# Patient Record
Sex: FEMALE | Race: WHITE | Employment: FULL TIME | URBAN - NONMETROPOLITAN AREA
[De-identification: names, ages, dates, MRNs, and addresses within clinical notes are randomized per-mention and may not be internally consistent; named-entity substitution may affect disease eponyms.]

---

## 2017-01-27 RX ORDER — ALPRAZOLAM 0.5 MG/1
TABLET ORAL
Qty: 60 TABLET | Refills: 0 | OUTPATIENT
Start: 2017-01-27

## 2017-02-07 RX ORDER — ALPRAZOLAM 0.5 MG/1
TABLET ORAL
Qty: 60 TABLET | Refills: 1 | OUTPATIENT
Start: 2017-02-07

## 2017-03-09 ENCOUNTER — TELEPHONE (OUTPATIENT)
Dept: FAMILY MEDICINE CLINIC | Age: 57
End: 2017-03-09

## 2017-03-09 DIAGNOSIS — G47.00 INSOMNIA, UNSPECIFIED TYPE: Primary | ICD-10-CM

## 2017-03-09 RX ORDER — ALPRAZOLAM 0.5 MG/1
TABLET ORAL
Qty: 60 TABLET | Refills: 1 | OUTPATIENT
Start: 2017-03-09

## 2017-03-10 RX ORDER — ALPRAZOLAM 0.5 MG/1
TABLET ORAL
Refills: 0 | Status: CANCELLED | OUTPATIENT
Start: 2017-03-10

## 2017-03-27 ENCOUNTER — OFFICE VISIT (OUTPATIENT)
Dept: FAMILY MEDICINE CLINIC | Age: 57
End: 2017-03-27
Payer: COMMERCIAL

## 2017-03-27 VITALS
WEIGHT: 160.4 LBS | HEART RATE: 97 BPM | OXYGEN SATURATION: 99 % | BODY MASS INDEX: 25.18 KG/M2 | TEMPERATURE: 98.6 F | DIASTOLIC BLOOD PRESSURE: 90 MMHG | SYSTOLIC BLOOD PRESSURE: 130 MMHG | HEIGHT: 67 IN

## 2017-03-27 DIAGNOSIS — F51.01 PRIMARY INSOMNIA: Primary | ICD-10-CM

## 2017-03-27 DIAGNOSIS — F41.9 ANXIETY: ICD-10-CM

## 2017-03-27 PROCEDURE — 99213 OFFICE O/P EST LOW 20 MIN: CPT | Performed by: FAMILY MEDICINE

## 2017-03-27 RX ORDER — ALPRAZOLAM 0.5 MG/1
0.5 TABLET ORAL PRN
Qty: 10 TABLET | Refills: 1 | Status: SHIPPED | OUTPATIENT
Start: 2017-03-27 | End: 2017-12-27 | Stop reason: SDUPTHER

## 2017-03-27 RX ORDER — TRAZODONE HYDROCHLORIDE 50 MG/1
50 TABLET ORAL NIGHTLY
Qty: 30 TABLET | Refills: 1 | Status: SHIPPED | OUTPATIENT
Start: 2017-03-27 | End: 2017-12-27

## 2017-03-27 ASSESSMENT — ENCOUNTER SYMPTOMS
NAUSEA: 0
DIARRHEA: 0
CONSTIPATION: 0
ABDOMINAL PAIN: 0
SHORTNESS OF BREATH: 0
CHEST TIGHTNESS: 0
COUGH: 0
WHEEZING: 0

## 2017-03-27 ASSESSMENT — PATIENT HEALTH QUESTIONNAIRE - PHQ9
2. FEELING DOWN, DEPRESSED OR HOPELESS: 0
1. LITTLE INTEREST OR PLEASURE IN DOING THINGS: 0
SUM OF ALL RESPONSES TO PHQ QUESTIONS 1-9: 0
SUM OF ALL RESPONSES TO PHQ9 QUESTIONS 1 & 2: 0

## 2017-05-11 ENCOUNTER — OFFICE VISIT (OUTPATIENT)
Dept: FAMILY MEDICINE CLINIC | Age: 57
End: 2017-05-11
Payer: COMMERCIAL

## 2017-05-11 VITALS
HEART RATE: 98 BPM | OXYGEN SATURATION: 97 % | TEMPERATURE: 99.7 F | SYSTOLIC BLOOD PRESSURE: 110 MMHG | HEIGHT: 67 IN | DIASTOLIC BLOOD PRESSURE: 70 MMHG | BODY MASS INDEX: 25.21 KG/M2 | WEIGHT: 160.6 LBS

## 2017-05-11 DIAGNOSIS — F51.01 PRIMARY INSOMNIA: ICD-10-CM

## 2017-05-11 DIAGNOSIS — I47.1 SUPRAVENTRICULAR TACHYCARDIA (HCC): Primary | ICD-10-CM

## 2017-05-11 DIAGNOSIS — F41.9 ANXIETY: ICD-10-CM

## 2017-05-11 PROCEDURE — 99214 OFFICE O/P EST MOD 30 MIN: CPT | Performed by: FAMILY MEDICINE

## 2017-05-11 RX ORDER — RAMELTEON 8 MG/1
8 TABLET ORAL NIGHTLY
Qty: 30 TABLET | Refills: 3 | Status: CANCELLED | OUTPATIENT
Start: 2017-05-11

## 2017-05-11 ASSESSMENT — ENCOUNTER SYMPTOMS
COUGH: 0
ABDOMINAL PAIN: 0
NAUSEA: 0
WHEEZING: 0
CHEST TIGHTNESS: 0
CONSTIPATION: 0
SHORTNESS OF BREATH: 0
DIARRHEA: 0

## 2017-07-14 ENCOUNTER — TELEPHONE (OUTPATIENT)
Dept: FAMILY MEDICINE CLINIC | Age: 57
End: 2017-07-14

## 2017-07-17 DIAGNOSIS — Z12.31 VISIT FOR SCREENING MAMMOGRAM: ICD-10-CM

## 2017-12-06 LAB
CHOLESTEROL, TOTAL: 232 MG/DL
CHOLESTEROL/HDL RATIO: 3.1
HDLC SERPL-MCNC: 74 MG/DL (ref 35–70)
LDL CHOLESTEROL CALCULATED: 138 MG/DL (ref 0–160)
TRIGL SERPL-MCNC: 100 MG/DL
VLDLC SERPL CALC-MCNC: 20 MG/DL

## 2017-12-07 ENCOUNTER — TELEPHONE (OUTPATIENT)
Dept: FAMILY MEDICINE CLINIC | Age: 57
End: 2017-12-07

## 2017-12-07 NOTE — TELEPHONE ENCOUNTER
Please let her know that her liver and kidneys are normal. Her cholesterol is excellent. She is not anemic.

## 2017-12-27 ENCOUNTER — OFFICE VISIT (OUTPATIENT)
Dept: FAMILY MEDICINE CLINIC | Age: 57
End: 2017-12-27
Payer: COMMERCIAL

## 2017-12-27 VITALS
HEART RATE: 91 BPM | TEMPERATURE: 98.9 F | DIASTOLIC BLOOD PRESSURE: 80 MMHG | OXYGEN SATURATION: 99 % | HEIGHT: 67 IN | BODY MASS INDEX: 27.09 KG/M2 | WEIGHT: 172.6 LBS | SYSTOLIC BLOOD PRESSURE: 118 MMHG

## 2017-12-27 DIAGNOSIS — F41.9 ANXIETY: Primary | ICD-10-CM

## 2017-12-27 DIAGNOSIS — I47.1 SUPRAVENTRICULAR TACHYCARDIA (HCC): ICD-10-CM

## 2017-12-27 DIAGNOSIS — E55.9 VITAMIN D DEFICIENCY: ICD-10-CM

## 2017-12-27 PROCEDURE — 99214 OFFICE O/P EST MOD 30 MIN: CPT | Performed by: FAMILY MEDICINE

## 2017-12-27 RX ORDER — ALPRAZOLAM 0.5 MG/1
0.5 TABLET ORAL PRN
Qty: 10 TABLET | Refills: 0 | Status: SHIPPED | OUTPATIENT
Start: 2017-12-27

## 2017-12-27 ASSESSMENT — ENCOUNTER SYMPTOMS
ABDOMINAL PAIN: 0
COUGH: 0
CONSTIPATION: 0
DIARRHEA: 0
CHEST TIGHTNESS: 0
WHEEZING: 0
SHORTNESS OF BREATH: 0
BACK PAIN: 0
SINUS PRESSURE: 0

## 2017-12-27 NOTE — PROGRESS NOTES
D deficiency               Plan:       Anxiety: improving; she has not had any recent flare ups of her anxiety so I will continue to monitor. Palpitations: resolved: she is not having any symptoms right now so I will continue her off of the metoprolol. Vit D def: worsening; her vit D was low on recent labs so I recommended she take 1-2000 units a day. Return in about 1 year (around 12/27/2018) for Well woman. Orders Placed This Encounter   Medications    ALPRAZolam (XANAX) 0.5 MG tablet     Sig: Take 1 tablet by mouth as needed for Anxiety . Dispense:  10 tablet     Refill:  0       Patient given educational materials - see patient instructions. Discussed use, benefit, and side effects of prescribed medications. All patient questions answered. Pt voiced understanding. Reviewed health maintenance. Instructed to continue current medications, diet and exercise. Patient agreed with treatment plan. Follow up as directed.      Electronically signed by Roopa Salgado MD on 12/27/2017 at 11:21 AM

## 2018-02-24 ENCOUNTER — OFFICE VISIT (OUTPATIENT)
Dept: PRIMARY CARE CLINIC | Age: 58
End: 2018-02-24
Payer: COMMERCIAL

## 2018-02-24 VITALS
SYSTOLIC BLOOD PRESSURE: 138 MMHG | HEART RATE: 120 BPM | TEMPERATURE: 98.8 F | DIASTOLIC BLOOD PRESSURE: 80 MMHG | WEIGHT: 170 LBS | BODY MASS INDEX: 26.63 KG/M2 | OXYGEN SATURATION: 99 %

## 2018-02-24 DIAGNOSIS — J01.40 ACUTE NON-RECURRENT PANSINUSITIS: Primary | ICD-10-CM

## 2018-02-24 PROCEDURE — 99214 OFFICE O/P EST MOD 30 MIN: CPT | Performed by: FAMILY MEDICINE

## 2018-02-24 RX ORDER — AZITHROMYCIN 250 MG/1
TABLET, FILM COATED ORAL
Qty: 1 PACKET | Refills: 1 | Status: SHIPPED | OUTPATIENT
Start: 2018-02-24 | End: 2019-07-09

## 2018-02-24 ASSESSMENT — ENCOUNTER SYMPTOMS
RHINORRHEA: 1
SINUS PRESSURE: 1
SHORTNESS OF BREATH: 0
TROUBLE SWALLOWING: 0
NAUSEA: 0
DIARRHEA: 0
EYE DISCHARGE: 0
VOMITING: 0
EYE REDNESS: 0
WHEEZING: 0
CONSTIPATION: 0
ABDOMINAL PAIN: 0
COUGH: 1
SINUS PAIN: 1
SORE THROAT: 0

## 2018-10-19 LAB
CHOLESTEROL, TOTAL: 211 MG/DL
CHOLESTEROL/HDL RATIO: 3.1
CREATININE: 0.7 MG/DL
HDLC SERPL-MCNC: 68 MG/DL (ref 35–70)
LDL CHOLESTEROL CALCULATED: 120 MG/DL (ref 0–160)
POTASSIUM (K+): 3.9
TRIGL SERPL-MCNC: 117 MG/DL
VLDLC SERPL CALC-MCNC: 23 MG/DL

## 2018-10-23 ENCOUNTER — TELEPHONE (OUTPATIENT)
Dept: FAMILY MEDICINE CLINIC | Age: 58
End: 2018-10-23

## 2018-10-30 ENCOUNTER — TELEPHONE (OUTPATIENT)
Dept: FAMILY MEDICINE CLINIC | Age: 58
End: 2018-10-30

## 2018-10-30 NOTE — TELEPHONE ENCOUNTER
Please let her know that her blood sugar is normal. Her liver and kidneys are normal and her cholesterol is excellent. She is not anemic.  Please let her know that her thyroid is normal

## 2019-07-09 ENCOUNTER — OFFICE VISIT (OUTPATIENT)
Dept: PRIMARY CARE CLINIC | Age: 59
End: 2019-07-09
Payer: COMMERCIAL

## 2019-07-09 ENCOUNTER — HOSPITAL ENCOUNTER (EMERGENCY)
Age: 59
Discharge: HOME | End: 2019-07-09
Attending: EMERGENCY MEDICINE
Payer: COMMERCIAL

## 2019-07-09 VITALS
BODY MASS INDEX: 26.53 KG/M2 | TEMPERATURE: 98.4 F | OXYGEN SATURATION: 97 % | WEIGHT: 169 LBS | DIASTOLIC BLOOD PRESSURE: 73 MMHG | HEIGHT: 67 IN | RESPIRATION RATE: 16 BRPM | HEART RATE: 70 BPM | SYSTOLIC BLOOD PRESSURE: 125 MMHG

## 2019-07-09 VITALS
OXYGEN SATURATION: 98 % | WEIGHT: 169.6 LBS | SYSTOLIC BLOOD PRESSURE: 118 MMHG | HEIGHT: 67 IN | DIASTOLIC BLOOD PRESSURE: 80 MMHG | BODY MASS INDEX: 26.62 KG/M2 | HEART RATE: 117 BPM

## 2019-07-09 DIAGNOSIS — Z29.14 ENCOUNTER FOR PROPHYLACTIC ADMINISTRATION OF RABIES IMMUNE GLOBULIN: Primary | ICD-10-CM
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
## 2019-07-09 PROCEDURE — 90471 IMMUNIZATION ADMIN: CPT | Performed by: EMERGENCY MEDICINE

## 2019-07-09 PROCEDURE — 96372 THER/PROPH/DIAG INJ SC/IM: CPT

## 2019-07-09 PROCEDURE — 90675 RABIES VACCINE IM: CPT | Performed by: EMERGENCY MEDICINE

## 2019-07-09 PROCEDURE — 90375 RABIES IG IM/SC: CPT | Performed by: EMERGENCY MEDICINE

## 2019-07-09 PROCEDURE — 99282 EMERGENCY DEPT VISIT SF MDM: CPT

## 2019-07-09 PROCEDURE — 6360000002 HC RX W HCPCS: Performed by: EMERGENCY MEDICINE

## 2019-07-09 PROCEDURE — 99213 OFFICE O/P EST LOW 20 MIN: CPT | Performed by: FAMILY MEDICINE

## 2019-07-09 RX ADMIN — RABIES IMMUNE GLOBULIN (HUMAN) 1500 UNITS: 300 INJECTION, SOLUTION INFILTRATION; INTRAMUSCULAR at 17:27

## 2019-07-09 RX ADMIN — RABIES VACCINE 1 ML: KIT at 17:28

## 2019-07-09 ASSESSMENT — PATIENT HEALTH QUESTIONNAIRE - PHQ9
SUM OF ALL RESPONSES TO PHQ QUESTIONS 1-9: 0
1. LITTLE INTEREST OR PLEASURE IN DOING THINGS: 0
2. FEELING DOWN, DEPRESSED OR HOPELESS: 0
SUM OF ALL RESPONSES TO PHQ9 QUESTIONS 1 & 2: 0
SUM OF ALL RESPONSES TO PHQ QUESTIONS 1-9: 0

## 2019-07-09 ASSESSMENT — ENCOUNTER SYMPTOMS
WHEEZING: 0
COLOR CHANGE: 0
SHORTNESS OF BREATH: 0

## 2019-07-09 NOTE — ED PROVIDER NOTES
kg)   Height: 5' 7\" (1.702 m)     -------------------------   ,  ,  ,          Re-evaluation Notes        CRITICAL CARE:   None        CONSULTS:      PROCEDURES:  None    FINAL IMPRESSION      1. Encounter for prophylactic administration of rabies immune globulin          DISPOSITION/PLAN   DISPOSITION Decision To Discharge    Condition on Disposition    Discharged in stable condition    PATIENT REFERRED TO:  Radha Oreilly MD  [place] [address].  [phone]    Return on day 3, day 7, and day 14 for additional doses of the rabies vaccine      DISCHARGE MEDICATIONS:  New Prescriptions    No medications on file       (Please note that portions of this note were completed with a voice recognition program.  Efforts were made to edit the dictations but occasionally words are mis-transcribed.)    Alston MD, F.A.A.E.M.   Attending Emergency Physician                          Kasey Lennon MD  07/09/19 [id number]

## 2019-07-09 NOTE — PROGRESS NOTES
[facility]  [address] [facility] [address]  Dept: [phone]  Dept Fax: [phone]  Loc: [phone]    Janelle Parra is a 62 y.o. female who presents today for her medical conditions/complaints as noted below. Janelle Parra is c/o of   Chief Complaint   Patient presents with   [facility]     think she was bit by a bat on the left wrist while she was sleeping - she woke up with scratches and 2 little bite marks on her arm- has seen bats in her home before in the basements       HPI:     HPI Here today for concerns about being bit by a bat. She woke up this morning with two scratches on her arm and two small pin prick spots on her left wrist. She did not feel anything and she has no known injury. She has been feeling fine today but she worries about what she saw on google. She has had bats in her basement before so she does have a possible exposure. Past Medical History:   Diagnosis Date    Cervical dysplasia     1988 and 1990    Eczema     Generalized anxiety disorder     panic disorder    Hypercholesterolemia     with high HDL and low 5 year CV risk factor.  Insomnia     Perimenopausal     Supraventricular tachycardia (HCC)     with fevers or anxiety, xanax does help    White coat hypertension     pt checks her BP's at home and they have been good. Social History     Tobacco Use    Smoking status: Never Smoker    Smokeless tobacco: Never Used   Substance Use Topics    Alcohol use: Yes     Comment: Occasional     Current Outpatient Medications   Medication Sig Dispense Refill    ALPRAZolam (XANAX) 0.5 MG tablet Take 1 tablet by mouth as needed for Anxiety . 10 tablet 0     No current facility-administered medications for this visit. No Known Allergies    Subjective:     Review of Systems   Constitutional: Negative for activity change, appetite change, fatigue and fever.    Respiratory: Negative for shortness of breath and wheezing. Skin: Positive for wound. Negative for color change and rash. Neurological: Negative for dizziness, weakness and numbness. Objective:      Physical Exam   Constitutional: She appears well-developed and well-nourished. No distress. Musculoskeletal:        Arms:  Psychiatric: Her mood appears anxious. Nursing note and vitals reviewed. /80 (Site: Left Upper Arm, Position: Sitting, Cuff Size: Medium Adult)   Pulse 117   Ht 5' 7\" (1.702 m)   Wt 169 lb 9.6 oz (76.9 kg)   LMP 08/26/2011   SpO2 98%   BMI 26.56 kg/m²     Assessment:       Diagnosis Orders   1. Bat bite wound               Plan:        Possible bat bite: new; I cannot conclude that she was not bit by a bat so I sent her to the ER for evaluation and possible post exposure prophylaxis. Return if symptoms worsen or fail to improve. Patientgiven educational materials - see patient instructions. Discussed use, benefit,and side effects of prescribed medications. All patient questions answered. Ptvoiced understanding. Reviewed health maintenance. Instructed to continue currentmedications, diet and exercise. Patient agreed with treatment plan. Follow up asdirected.      Electronically signed by Shaina Blum MD on 7/9/2019 at 4:10 PM

## 2019-07-12 ENCOUNTER — HOSPITAL ENCOUNTER (OUTPATIENT)
Dept: INFUSION THERAPY | Age: 59
Setting detail: INFUSION SERIES
Discharge: HOME | End: 2019-07-12
Payer: COMMERCIAL

## 2019-07-12 PROCEDURE — 90675 RABIES VACCINE IM: CPT

## 2019-07-12 PROCEDURE — 6360000002 HC RX W HCPCS

## 2019-07-12 PROCEDURE — 90471 IMMUNIZATION ADMIN: CPT

## 2019-07-12 RX ADMIN — RABIES VACCINE 1 ML: KIT at 10:17

## 2019-07-16 ENCOUNTER — HOSPITAL ENCOUNTER (OUTPATIENT)
Dept: INFUSION THERAPY | Age: 59
Setting detail: INFUSION SERIES
Discharge: HOME | End: 2019-07-16
Payer: COMMERCIAL

## 2019-07-16 VITALS
OXYGEN SATURATION: 98 % | SYSTOLIC BLOOD PRESSURE: 145 MMHG | RESPIRATION RATE: 16 BRPM | HEART RATE: 90 BPM | DIASTOLIC BLOOD PRESSURE: 82 MMHG | TEMPERATURE: 98.7 F

## 2019-07-16 PROCEDURE — 90471 IMMUNIZATION ADMIN: CPT | Performed by: EMERGENCY MEDICINE

## 2019-07-16 PROCEDURE — 90675 RABIES VACCINE IM: CPT | Performed by: EMERGENCY MEDICINE

## 2019-07-16 PROCEDURE — 6360000002 HC RX W HCPCS: Performed by: EMERGENCY MEDICINE

## 2019-07-16 RX ADMIN — RABIES VACCINE 1 ML: KIT at 09:37

## 2019-07-23 ENCOUNTER — HOSPITAL ENCOUNTER (OUTPATIENT)
Dept: INFUSION THERAPY | Age: 59
Setting detail: INFUSION SERIES
Discharge: HOME | End: 2019-07-23
Payer: COMMERCIAL

## 2019-07-23 VITALS
OXYGEN SATURATION: 98 % | TEMPERATURE: 99.3 F | DIASTOLIC BLOOD PRESSURE: 79 MMHG | HEART RATE: 95 BPM | RESPIRATION RATE: 12 BRPM | SYSTOLIC BLOOD PRESSURE: 131 MMHG

## 2019-07-23 PROCEDURE — 90675 RABIES VACCINE IM: CPT | Performed by: EMERGENCY MEDICINE

## 2019-07-23 PROCEDURE — 90471 IMMUNIZATION ADMIN: CPT | Performed by: EMERGENCY MEDICINE

## 2019-07-23 PROCEDURE — 6360000002 HC RX W HCPCS: Performed by: EMERGENCY MEDICINE

## 2019-07-23 RX ADMIN — RABIES VACCINE 1 ML: KIT at 13:35

## 2019-08-14 ENCOUNTER — HOSPITAL ENCOUNTER (OUTPATIENT)
Age: 59
Setting detail: SPECIMEN
Discharge: HOME | End: 2019-08-14
Payer: COMMERCIAL

## 2019-08-14 ENCOUNTER — OFFICE VISIT (OUTPATIENT)
Dept: FAMILY MEDICINE CLINIC | Age: 59
End: 2019-08-14
Payer: COMMERCIAL

## 2019-08-14 VITALS
HEART RATE: 105 BPM | OXYGEN SATURATION: 99 % | SYSTOLIC BLOOD PRESSURE: 110 MMHG | WEIGHT: 171.6 LBS | BODY MASS INDEX: 26.88 KG/M2 | DIASTOLIC BLOOD PRESSURE: 70 MMHG

## 2019-08-14 DIAGNOSIS — F41.1 GAD (GENERALIZED ANXIETY DISORDER): ICD-10-CM

## 2019-08-14 DIAGNOSIS — Z12.4 CERVICAL CANCER SCREENING: ICD-10-CM

## 2019-08-14 DIAGNOSIS — Z01.419 WELL WOMAN EXAM WITH ROUTINE GYNECOLOGICAL EXAM: Primary | ICD-10-CM

## 2019-08-14 PROCEDURE — 87624 HPV HI-RISK TYP POOLED RSLT: CPT

## 2019-08-14 PROCEDURE — 99214 OFFICE O/P EST MOD 30 MIN: CPT | Performed by: FAMILY MEDICINE

## 2019-08-14 PROCEDURE — G0145 SCR C/V CYTO,THINLAYER,RESCR: HCPCS

## 2019-08-14 PROCEDURE — 99396 PREV VISIT EST AGE 40-64: CPT | Performed by: FAMILY MEDICINE

## 2019-08-14 RX ORDER — ESCITALOPRAM OXALATE 10 MG/1
10 TABLET ORAL DAILY
Qty: 30 TABLET | Refills: 1 | Status: SHIPPED | OUTPATIENT
Start: 2019-08-14

## 2019-08-14 RX ORDER — BUSPIRONE HYDROCHLORIDE 10 MG/1
10 TABLET ORAL 3 TIMES DAILY PRN
Qty: 90 TABLET | Refills: 1 | Status: SHIPPED | OUTPATIENT
Start: 2019-08-14 | End: 2019-09-13

## 2019-08-14 ASSESSMENT — ENCOUNTER SYMPTOMS
ABDOMINAL PAIN: 0
SHORTNESS OF BREATH: 0
BACK PAIN: 0
WHEEZING: 0
CONSTIPATION: 0
COUGH: 0
CHEST TIGHTNESS: 0
DIARRHEA: 0

## 2019-08-14 NOTE — PROGRESS NOTES
[facility]  [address]  [place] [address]  Dept: [phone]  Dept Fax: [phone]  Loc: [phone]    Gena Larry is a 62 y.o. female who presents today for her medical conditions/complaints as noted below. Gena Larry is c/o of   Chief Complaint   Patient presents with    Gynecologic Exam    Anxiety     feels like she has been a nervous wreck - with everything that is going on    Other     left side under collar bone- swollen gland- appeared after 3rd rabies shot       HPI:     HPI Here today for a well visit and she is feeling more anxious. Anxiety: worsening; she is really stressed out. After her recent possible bat bite she has been really overwhelmed. Her oldest son went through a bad divorce this year and has really taken a toll on her. She also also has a swollen lymph node that is really worrying her. It started right after her rabies vaccine. Today is the first day of school for her too. Her  had a hard attack several years ago so she worried about him all of the time. She would rather not be on medication if she can avoid it. Diet: she is trying to eat a healthy low carb and low fat diet. Exercise: she has been riding bikes 4x a week. She is walking and will do a couple of miles a day. She is not having periods. She is not having any pain with sex. No vaginal discharge. Past Medical History:   Diagnosis Date    Cervical dysplasia     1988 and 1990    Eczema     Generalized anxiety disorder     panic disorder    Hypercholesterolemia     with high HDL and low 5 year CV risk factor.  Insomnia     Perimenopausal     Supraventricular tachycardia (HCC)     with fevers or anxiety, xanax does help    White coat hypertension     pt checks her BP's at home and they have been good.           Social History     Tobacco Use    Smoking status: Never Smoker    Smokeless tobacco: Never Used   Substance Use Topics    She has no rales. Abdominal: Soft. Bowel sounds are normal. She exhibits no distension. There is no tenderness. There is no guarding. Genitourinary: Vagina normal and uterus normal. There is no rash, tenderness, lesion or injury on the right labia. There is no rash, tenderness, lesion or injury on the left labia. Cervix exhibits no motion tenderness, no discharge and no friability. Right adnexum displays no mass, no tenderness and no fullness. Left adnexum displays no mass, no tenderness and no fullness. Genitourinary Comments: Significant bladder prolapse   Musculoskeletal: Normal range of motion. She exhibits no edema. Lymphadenopathy:     She has no cervical adenopathy. Neurological: She is alert and oriented to person, place, and time. Skin: Skin is warm and dry. No rash noted. Psychiatric: She has a normal mood and affect. Her behavior is normal. Judgment normal.   Nursing note and vitals reviewed. /70 (Site: Left Upper Arm, Position: Sitting, Cuff Size: Medium Adult)   Pulse 105   Wt 171 lb 9.6 oz (77.8 kg)   LMP 08/26/2011   SpO2 99%   BMI 26.88 kg/m²     Assessment:       Diagnosis Orders   1. Well woman exam with routine gynecological exam     2. Cervical cancer screening  PAP SMEAR   3. LUDMILA (generalized anxiety disorder)               Plan:        Well woman: she is doing very well overall. She is due for her pap today so that was done. She recently had a normal mammogram. I encouraged her to stay active and to try to eat healthy. She was reassured that she does not have any enlarged lymph nodes. Anxiety: worsening; she is really struggling so Katherine Negro was started on lexapro. I explained that the main side effect is GI distress. I will start with a low dose and titrate up as needed. she was instructed that it can take 4-6weeks before the medication is working to its full potential. I will see how she is doing in a month and increase the dose as needed.  I also explained that

## 2019-08-16 LAB
HPV SAMPLE: NORMAL
HPV, GENOTYPE 16: NOT DETECTED
HPV, GENOTYPE 18: NOT DETECTED
HPV, HIGH RISK OTHER: NOT DETECTED
HPV, INTERPRETATION: NORMAL
SPECIMEN DESCRIPTION: NORMAL

## 2019-08-19 LAB — CYTOLOGY REPORT: NORMAL

## 2019-11-14 ENCOUNTER — TELEPHONE (OUTPATIENT)
Dept: FAMILY MEDICINE CLINIC | Age: 59
End: 2019-11-14

## 2021-07-15 ENCOUNTER — TELEPHONE (OUTPATIENT)
Dept: FAMILY MEDICINE CLINIC | Age: 61
End: 2021-07-15

## 2021-07-15 NOTE — TELEPHONE ENCOUNTER
As far as I know she would not need a booster.  She would probably need the series again if she had another possible bite though and those are only done in the ER

## 2021-07-15 NOTE — TELEPHONE ENCOUNTER
2 years ago pt got rabies vaccine due to a suspicous bite and wanted to know if she needed a booster shot and where to go to get one she, she did not want to go to the ER again for one and tried the health department but they did not do those either

## 2021-07-16 ENCOUNTER — HOSPITAL ENCOUNTER (EMERGENCY)
Age: 61
Discharge: HOME | End: 2021-07-16
Attending: EMERGENCY MEDICINE
Payer: COMMERCIAL

## 2021-07-16 VITALS
HEART RATE: 102 BPM | WEIGHT: 170 LBS | OXYGEN SATURATION: 98 % | HEIGHT: 67 IN | BODY MASS INDEX: 26.68 KG/M2 | DIASTOLIC BLOOD PRESSURE: 92 MMHG | TEMPERATURE: 98.6 F | RESPIRATION RATE: 14 BRPM | SYSTOLIC BLOOD PRESSURE: 148 MMHG

## 2021-07-16 DIAGNOSIS — Z00.00 NORMAL EXAM: Primary | ICD-10-CM

## 2021-07-16 PROCEDURE — 99285 EMERGENCY DEPT VISIT HI MDM: CPT

## 2021-07-16 NOTE — ED PROVIDER NOTES
[facility] ED  [address]  [place] [address]  Phone: [phone]        [facility] [place] ED  EMERGENCY DEPARTMENT ENCOUNTER      Pt Name: Carmita Flynn  MRN: [id number]  Raf[place]prate[place] 1960  Date of evaluation: 7/16/2021  Provider: Oniel Robbins DO    CHIEF COMPLAINT       Chief Complaint   Patient presents with    Other     possible bat exposure, not bite          HISTORY OF PRESENT ILLNESS   (Location/Symptom, Timing/Onset,Context/Setting, Quality, Duration, Modifying Factors, Severity)  Note limiting factors. Carmita Flynn is a 61 y.o. female who presents to the emergency department Requesting that we check her scalp for a bite wound. Patient states that she was outside 2 days ago when something flew in her hair. She does not know what it was. Does not think it landed on her or bit her, however, it flew in her hair. Patient states 2 years ago she had an exposure to a bat and she did end up receiving a full postexposure rabies prophylaxis course. Patient currently denying any symptoms but has concerned because of possible exposure. Would like us to check her for bite wound    Nursing Notes were reviewed. REVIEW OF SYSTEMS    (2-9systems for level 4, 10 or more for level 5)     Review of Systems   Constitutional: Negative for fever. Skin: Negative for rash and wound. Except asnoted above the remainder of the review of systems was reviewed and negative. PAST MEDICAL HISTORY     Past Medical History:   Diagnosis Date    Cervical dysplasia     1988 and 1990    Eczema     Generalized anxiety disorder     panic disorder    Hypercholesterolemia     with high HDL and low 5 year CV risk factor.  Insomnia     Perimenopausal     Supraventricular tachycardia (HCC)     with fevers or anxiety, xanax does help    White coat hypertension     pt checks her BP's at home and they have been good.          SURGICAL HISTORY       Past Surgical History: Procedure Laterality Date    COLONOSCOPY  2011    x3 due to mild cercial atypia, removal of small hyperplastic polyp, repeat in 10 years    COLPOSCOPY      had several 88 and 90'         CURRENT MEDICATIONS     Previous Medications    ALPRAZOLAM (XANAX) 0.5 MG TABLET    Take 1 tablet by mouth as needed for Anxiety . ESCITALOPRAM (LEXAPRO) 10 MG TABLET    Take 1 tablet by mouth daily       ALLERGIES     Patient has no known allergies. FAMILY HISTORY       Family History   Problem Relation Age of Onset    Stroke Mother         transient ischemic attack    Other Father         macular degeneration    High Cholesterol Father     Thyroid Disease Father           SOCIAL HISTORY       Social History     Socioeconomic History    Marital status:      Spouse name: None    Number of children: None    Years of education: None    Highest education level: None   Occupational History    None   Tobacco Use    Smoking status: Never Smoker    Smokeless tobacco: Never Used   Substance and Sexual Activity    Alcohol use: Yes     Comment: Occasional    Drug use: No    Sexual activity: Yes     Partners: Male     Comment:    Other Topics Concern    None   Social History Narrative    None     Social Determinants of Health     Financial Resource Strain:     Difficulty of Paying Living Expenses:    Food Insecurity:     Worried About Running Out of Food in the Last Year:     Ran Out of Food in the Last Year:    Transportation Needs:     Lack of Transportation (Medical):      Lack of Transportation (Non-Medical):    Physical Activity:     Days of Exercise per Week:     Minutes of Exercise per Session:    Stress:     Feeling of Stress :    Social Connections:     Frequency of Communication with Friends and Family:     Frequency of Social Gatherings with Friends and Family:     Attends Mosque Services:     Active Member of Clubs or Organizations:     Attends Club or Organization Meetings:  Marital Status:    Intimate Partner Violence:     Fear of Current or Ex-Partner:     Emotionally Abused:     Physically Abused:     Sexually Abused:        SCREENINGS    [place] Coma Scale  Eye Opening: Spontaneous  Best Verbal Response: Oriented  Best Motor Response: Obeys commands  Prisca Coma Scale Score: 15        PHYSICAL EXAM    (up to 7 for level 4, 8 or more for level 5)     ED Triage Vitals [07/16/21 1301]   BP Temp Temp Source Pulse Resp SpO2 Height Weight   (!) 148/92 98.6 °F (37 °C) Tympanic 102 14 98 % 5' 7\" (1.702 m) 170 lb (77.1 kg)       Physical Exam  Vitals and nursing note reviewed. Constitutional:       General: She is not in acute distress. Appearance: Normal appearance. She is not ill-appearing or toxic-appearing. HENT:      Head: Normocephalic and atraumatic. Nose: Nose normal.      Mouth/Throat:      Mouth: Mucous membranes are moist.   Eyes:      Conjunctiva/sclera: Conjunctivae normal.   Cardiovascular:      Rate and Rhythm: Normal rate. Pulmonary:      Effort: Pulmonary effort is normal. No respiratory distress. Breath sounds: Normal breath sounds. Abdominal:      General: There is no distension. Musculoskeletal:         General: No deformity or signs of injury. Normal range of motion. Skin:     General: Skin is warm and dry. Capillary Refill: Capillary refill takes less than 2 seconds. Findings: No rash. Comments: No evidence of any bite wound or injury of the scalp   Neurological:      General: No focal deficit present. Mental Status: She is alert. Mental status is at baseline. Motor: No weakness. Comments: Speaking normally. No facial asymmetry. Moving all 4 extremities. Normal gait.     Psychiatric:         Mood and Affect: Mood normal.         EMERGENCY DEPARTMENT COURSE and DIFFERENTIAL DIAGNOSIS/MDM:   Vitals:    Vitals:    07/16/21 1301   BP: (!) 148/92   Pulse: 102   Resp: 14   Temp: 98.6 °F (37 °C)   TempSrc: Tympanic   SpO2: 98%   Weight: 170 lb (77.1 kg)   Height: 5' 7\" (1.702 m)       Patient presents to the emergency department with a complaint described above. Vitals are grossly normal, she is nontoxic and she is resting comfortably. Physical exam did not reveal any evidence of bite or puncture wound. Based on CDC as well as World Health Organization guidelines, because of the fact that the patient has had an entire rabies course including postexposure prophylaxis, especially within the last 2 years, in a region where rabies is not endemic and there is no obvious wound, this would qualify as class I which does not require any further intervention. I explained this to the patient. I recommend she follow-up with her PCP and the health department. No medication indicated    At this time the patient is without objective evidence of an acute process requiring hospitalization or inpatient management. They have remained hemodynamically stable and are stable for discharge with outpatient follow-up. Standard anticipatory guidance given to patient upon discharge. Have given them a specific time frame in which to follow-up and who to follow-up with. I have also advised them that they should return to the emergency department if they get worse, or not getting better or develop any new or concerning symptoms. Patient demonstrates understanding. PROCEDURES:  Unless otherwise noted below, none     Procedures    FINAL IMPRESSION      1.  Normal exam          DISPOSITION/PLAN   DISPOSITION Decision To Discharge 07/16/2021 01:18:04 PM      PATIENT REFERRED TO:  Ida Velasquez MD  [address] [place] [address],[address]  [phone]    In 1 week  As needed      DISCHARGE MEDICATIONS:  New Prescriptions    No medications on file          (Please note that portions of this note were completed with a voice recognition program.  Efforts were made to edit the dictations but occasionally words are mis-transcribed.)    Susie Santiago DO (electronically signed)  Board Certified Emergency Physician          Susie Santiago DO  07/16/21 [id number]

## 2021-07-16 NOTE — ED NOTES
Bed: 03  Expected date:   Expected time:   Means of arrival:   Comments:     Felisha Ross RN  07/16/21 [id number]

## 2021-07-19 ENCOUNTER — TELEPHONE (OUTPATIENT)
Dept: FAMILY MEDICINE CLINIC | Age: 61
End: 2021-07-19

## 2021-07-21 RX ORDER — ESCITALOPRAM OXALATE 10 MG/1
10 TABLET ORAL DAILY
Qty: 30 TABLET | Refills: 1 | OUTPATIENT
Start: 2021-07-21

## 2021-07-21 NOTE — TELEPHONE ENCOUNTER
Kyle Leblanc called requesting a refill of the below medication which has been pended for you: declined medication as patient has not been seen since 8/14/2019    Requested Prescriptions     Pending Prescriptions Disp Refills    escitalopram (LEXAPRO) 10 MG tablet 30 tablet 1     Sig: Take 1 tablet by mouth daily       Last Appointment Date: 8/14/2019  Next Appointment Date: Visit date not found    No Known Allergies

## 2022-10-20 ENCOUNTER — OFFICE VISIT (OUTPATIENT)
Dept: PRIMARY CARE CLINIC | Age: 62
End: 2022-10-20
Payer: COMMERCIAL

## 2022-10-20 VITALS
HEIGHT: 67 IN | DIASTOLIC BLOOD PRESSURE: 80 MMHG | BODY MASS INDEX: 27.47 KG/M2 | WEIGHT: 175 LBS | HEART RATE: 110 BPM | SYSTOLIC BLOOD PRESSURE: 124 MMHG | OXYGEN SATURATION: 98 % | TEMPERATURE: 97.9 F

## 2022-10-20 DIAGNOSIS — L23.9 ALLERGIC CONTACT DERMATITIS, UNSPECIFIED TRIGGER: Primary | ICD-10-CM

## 2022-10-20 PROCEDURE — 96372 THER/PROPH/DIAG INJ SC/IM: CPT | Performed by: NURSE PRACTITIONER

## 2022-10-20 PROCEDURE — 99213 OFFICE O/P EST LOW 20 MIN: CPT | Performed by: NURSE PRACTITIONER

## 2022-10-20 RX ORDER — BETAMETHASONE SODIUM PHOSPHATE AND BETAMETHASONE ACETATE 3; 3 MG/ML; MG/ML
12 INJECTION, SUSPENSION INTRA-ARTICULAR; INTRALESIONAL; INTRAMUSCULAR; SOFT TISSUE ONCE
Status: COMPLETED | OUTPATIENT
Start: 2022-10-20 | End: 2022-10-20

## 2022-10-20 RX ADMIN — BETAMETHASONE SODIUM PHOSPHATE AND BETAMETHASONE ACETATE 12 MG: 3; 3 INJECTION, SUSPENSION INTRA-ARTICULAR; INTRALESIONAL; INTRAMUSCULAR; SOFT TISSUE at 08:58

## 2022-10-20 ASSESSMENT — PATIENT HEALTH QUESTIONNAIRE - PHQ9
SUM OF ALL RESPONSES TO PHQ QUESTIONS 1-9: 0
1. LITTLE INTEREST OR PLEASURE IN DOING THINGS: 0
2. FEELING DOWN, DEPRESSED OR HOPELESS: 0
SUM OF ALL RESPONSES TO PHQ QUESTIONS 1-9: 0
SUM OF ALL RESPONSES TO PHQ9 QUESTIONS 1 & 2: 0

## 2022-10-20 NOTE — PROGRESS NOTES
Subjective:      Patient ID: Lashawn Crouch is a 64 y.o. female coming in for   Chief Complaint   Patient presents with    Skin Problem     Possible PI for about 6 days on neck,abdomen and back. Rash  This is a new problem. Episode onset: approx one week. Location: right upper chest, bilateral torso. The rash is characterized by redness and itchiness. Associated with: unsure of contacts,  and dog are out in the woods a lot. Past treatments include antibiotic cream. The treatment provided mild relief. Review of Systems   Skin:  Positive for rash. Objective:/80   Pulse (!) 110   Temp 97.9 °F (36.6 °C) (Tympanic)   Ht 5' 7\" (1.702 m)   Wt 175 lb (79.4 kg)   LMP 2011   SpO2 98%   BMI 27.41 kg/m²      Physical Exam  Vitals and nursing note reviewed. HENT:      Head: Normocephalic. Pulmonary:      Effort: Pulmonary effort is normal.   Musculoskeletal:      Cervical back: Neck supple. Skin:     Findings: Rash present. Rash is macular and papular. Neurological:      General: No focal deficit present. Mental Status: She is oriented to person, place, and time. Assessment:      1. Allergic contact dermatitis, unspecified trigger           Plan:    -unknown trigger of dermatitis  -celestone given here  -may continue with otc treatments as well. No orders of the defined types were placed in this encounter. Outpatient Encounter Medications as of 10/20/2022   Medication Sig Dispense Refill    escitalopram (LEXAPRO) 10 MG tablet Take 1 tablet by mouth daily (Patient not taking: Reported on 10/20/2022) 30 tablet 1    ALPRAZolam (XANAX) 0.5 MG tablet Take 1 tablet by mouth as needed for Anxiety . (Patient not taking: Reported on 10/20/2022) 10 tablet 0    [] betamethasone acetate-betamethasone sodium phosphate (CELESTONE) injection 12 mg        No facility-administered encounter medications on file as of 10/20/2022.             Francisco Luciano, APRN - CNP

## 2025-04-07 ENCOUNTER — OFFICE VISIT (OUTPATIENT)
Dept: FAMILY MEDICINE CLINIC | Age: 65
End: 2025-04-07
Payer: COMMERCIAL

## 2025-04-07 VITALS
HEART RATE: 121 BPM | DIASTOLIC BLOOD PRESSURE: 82 MMHG | WEIGHT: 184 LBS | OXYGEN SATURATION: 98 % | RESPIRATION RATE: 16 BRPM | SYSTOLIC BLOOD PRESSURE: 134 MMHG | HEIGHT: 67 IN | BODY MASS INDEX: 28.88 KG/M2

## 2025-04-07 DIAGNOSIS — F41.9 ANXIETY: ICD-10-CM

## 2025-04-07 DIAGNOSIS — R00.0 TACHYCARDIA: Primary | ICD-10-CM

## 2025-04-07 PROCEDURE — 99213 OFFICE O/P EST LOW 20 MIN: CPT | Performed by: FAMILY MEDICINE

## 2025-04-07 PROCEDURE — 93000 ELECTROCARDIOGRAM COMPLETE: CPT | Performed by: FAMILY MEDICINE

## 2025-04-07 RX ORDER — ESCITALOPRAM OXALATE 10 MG/1
10 TABLET ORAL DAILY
Qty: 30 TABLET | Refills: 3 | Status: SHIPPED | OUTPATIENT
Start: 2025-04-07

## 2025-04-07 RX ORDER — HYDROXYZINE PAMOATE 25 MG/1
25 CAPSULE ORAL 3 TIMES DAILY PRN
Qty: 30 CAPSULE | Refills: 1 | Status: SHIPPED | OUTPATIENT
Start: 2025-04-07

## 2025-04-07 SDOH — ECONOMIC STABILITY: FOOD INSECURITY: WITHIN THE PAST 12 MONTHS, YOU WORRIED THAT YOUR FOOD WOULD RUN OUT BEFORE YOU GOT MONEY TO BUY MORE.: NEVER TRUE

## 2025-04-07 SDOH — ECONOMIC STABILITY: FOOD INSECURITY: WITHIN THE PAST 12 MONTHS, THE FOOD YOU BOUGHT JUST DIDN'T LAST AND YOU DIDN'T HAVE MONEY TO GET MORE.: NEVER TRUE

## 2025-04-07 ASSESSMENT — ENCOUNTER SYMPTOMS
RESPIRATORY NEGATIVE: 1
EYES NEGATIVE: 1
GASTROINTESTINAL NEGATIVE: 1

## 2025-04-07 ASSESSMENT — PATIENT HEALTH QUESTIONNAIRE - PHQ9
1. LITTLE INTEREST OR PLEASURE IN DOING THINGS: NOT AT ALL
SUM OF ALL RESPONSES TO PHQ QUESTIONS 1-9: 0
SUM OF ALL RESPONSES TO PHQ QUESTIONS 1-9: 0
2. FEELING DOWN, DEPRESSED OR HOPELESS: NOT AT ALL
SUM OF ALL RESPONSES TO PHQ QUESTIONS 1-9: 0
SUM OF ALL RESPONSES TO PHQ QUESTIONS 1-9: 0

## 2025-04-07 NOTE — PROGRESS NOTES
Subjective:      Patient ID: Sheryl Dai is a 64 y.o. female.    HPI   Acute visit for palpitations. She is retired now, but seems to feel more anxiety now that she is home all days. Noticing palpitations in the last week. She recalls similar issues around 2004 with negative work up.  She was treated for anxiety and symptoms improved.  She worries about health, eating out more since [facility] and gaining wt..  she has had health updates with mammogram and pap and labs etc  everything reassuring.  No chest or sob.  She has a cup of coffee in the am.  No soda.  No energy drinks.      Past Medical History:   Diagnosis Date    Cervical dysplasia     1988 and 1990    Eczema     Generalized anxiety disorder     panic disorder    Hypercholesterolemia     with high HDL and low 5 year CV risk factor.    Insomnia     Perimenopausal     Supraventricular tachycardia     with fevers or anxiety, xanax does help    White coat hypertension     pt checks her BP's at home and they have been good.     Past Surgical History:   Procedure Laterality Date    COLONOSCOPY  01/01/2011    x3 due to mild cercial atypia, removal of small hyperplastic polyp, repeat in 10 years    COLONOSCOPY N/A 07/15/2022    [facility] [address]    COLPOSCOPY      had several 88 and 90'     Current Outpatient Medications   Medication Sig Dispense Refill    escitalopram (LEXAPRO) 10 MG tablet Take 1 tablet by mouth daily (Patient not taking: Reported on 4/7/2025) 30 tablet 1    ALPRAZolam (XANAX) 0.5 MG tablet Take 1 tablet by mouth as needed for Anxiety . (Patient not taking: Reported on 4/7/2025) 10 tablet 0     No current facility-administered medications for this visit.       Review of Systems   Constitutional: Negative.    HENT: Negative.     Eyes: Negative.    Respiratory: Negative.     Cardiovascular: Negative.    Gastrointestinal: Negative.    Genitourinary: Negative.    Musculoskeletal: Negative.    Skin: Negative.

## 2025-05-05 ENCOUNTER — PATIENT MESSAGE (OUTPATIENT)
Dept: FAMILY MEDICINE CLINIC | Age: 65
End: 2025-05-05

## 2025-06-12 ENCOUNTER — OFFICE VISIT (OUTPATIENT)
Dept: FAMILY MEDICINE CLINIC | Age: 65
End: 2025-06-12
Payer: COMMERCIAL

## 2025-06-12 VITALS
BODY MASS INDEX: 29.26 KG/M2 | HEIGHT: 67 IN | HEART RATE: 104 BPM | DIASTOLIC BLOOD PRESSURE: 82 MMHG | WEIGHT: 186.4 LBS | OXYGEN SATURATION: 94 % | SYSTOLIC BLOOD PRESSURE: 138 MMHG

## 2025-06-12 DIAGNOSIS — F41.9 ANXIETY: Primary | ICD-10-CM

## 2025-06-12 PROCEDURE — 99213 OFFICE O/P EST LOW 20 MIN: CPT | Performed by: FAMILY MEDICINE

## 2025-06-12 RX ORDER — ESCITALOPRAM OXALATE 10 MG/1
10 TABLET ORAL DAILY
Qty: 30 TABLET | Refills: 3 | Status: SHIPPED | OUTPATIENT
Start: 2025-06-12

## 2025-06-12 ASSESSMENT — ENCOUNTER SYMPTOMS
GASTROINTESTINAL NEGATIVE: 1
EYES NEGATIVE: 1
RESPIRATORY NEGATIVE: 1

## 2025-06-12 NOTE — PROGRESS NOTES
current facility-administered medications for this visit.       Review of Systems   Constitutional: Negative.    HENT: Negative.     Eyes: Negative.    Respiratory: Negative.     Cardiovascular: Negative.    Gastrointestinal: Negative.    Genitourinary: Negative.    Musculoskeletal: Negative.    Skin: Negative.    Neurological: Negative.    Psychiatric/Behavioral:  Positive for dysphoric mood and sleep disturbance. Negative for confusion, self-injury and suicidal ideas. The patient is nervous/anxious.        Objective:   Physical Exam  Constitutional:       General: She is not in acute distress.     Appearance: She is well-developed.   HENT:      Head: Normocephalic and atraumatic.      Right Ear: External ear normal.      Left Ear: External ear normal.      Mouth/Throat:      Pharynx: No oropharyngeal exudate.   Eyes:      General: No scleral icterus.     Conjunctiva/sclera: Conjunctivae normal.   Neck:      Thyroid: No thyromegaly.   Cardiovascular:      Rate and Rhythm: Normal rate and regular rhythm.      Heart sounds: Normal heart sounds. No murmur heard.  Pulmonary:      Effort: Pulmonary effort is normal. No respiratory distress.      Breath sounds: Normal breath sounds. No wheezing.   Abdominal:      General: Bowel sounds are normal. There is no distension.      Palpations: Abdomen is soft.      Tenderness: There is no abdominal tenderness. There is no rebound.   Musculoskeletal:         General: No tenderness. Normal range of motion.      Cervical back: Neck supple.   Skin:     General: Skin is warm and dry.      Findings: No erythema or rash.   Neurological:      Mental Status: She is alert and oriented to person, place, and time.   Psychiatric:         Behavior: Behavior normal.         Thought Content: Thought content normal.         Judgment: Judgment normal.       /82   Pulse (!) 104   Ht 1.702 m (5' 7\")   Wt 84.6 kg (186 lb 6.4 oz)   LMP 08/26/2011   SpO2 94%   BMI 29.19 kg/m²

## 2025-07-09 ENCOUNTER — PATIENT MESSAGE (OUTPATIENT)
Dept: FAMILY MEDICINE CLINIC | Age: 65
End: 2025-07-09